# Patient Record
Sex: FEMALE | Race: AMERICAN INDIAN OR ALASKA NATIVE
[De-identification: names, ages, dates, MRNs, and addresses within clinical notes are randomized per-mention and may not be internally consistent; named-entity substitution may affect disease eponyms.]

---

## 2019-11-18 ENCOUNTER — HOSPITAL ENCOUNTER (EMERGENCY)
Dept: HOSPITAL 5 - ED | Age: 64
Discharge: HOME | End: 2019-11-18
Payer: SELF-PAY

## 2019-11-18 VITALS — SYSTOLIC BLOOD PRESSURE: 152 MMHG | DIASTOLIC BLOOD PRESSURE: 93 MMHG

## 2019-11-18 DIAGNOSIS — Z91.013: ICD-10-CM

## 2019-11-18 DIAGNOSIS — Z79.1: ICD-10-CM

## 2019-11-18 DIAGNOSIS — Z79.899: ICD-10-CM

## 2019-11-18 DIAGNOSIS — M17.12: Primary | ICD-10-CM

## 2019-11-18 PROCEDURE — 99283 EMERGENCY DEPT VISIT LOW MDM: CPT

## 2019-11-18 PROCEDURE — 73562 X-RAY EXAM OF KNEE 3: CPT

## 2019-11-18 NOTE — EMERGENCY DEPARTMENT REPORT
ED Extremity Problem HPI





- General


Chief complaint: Extremity Problem,Nontraumatic


Stated complaint: LT KNEE PAIN


Time Seen by Provider: 11/18/19 11:55


Source: patient


Mode of arrival: Ambulatory


Limitations: No Limitations





- History of Present Illness


Initial comments: 





This is a 64-year-old female who presents to ED complaining of knee pain for the

past week.  Patient states 3 days ago and got worse.  She denies any injury b or

trauma to the left knee.  Patient states any pain is throbbing and aching, 

intermittent.  It is 7 out of 10 intensity pain.  She denies having any Fall


MD Complaint: extremity pain (knee left)


Severity scale (0 -10): 5





- Related Data


                                  Previous Rx's











 Medication  Instructions  Recorded  Last Taken  Type


 


Cyclobenzaprine [Flexeril] 10 mg PO QHS PRN #20 tablet 11/18/19 Unknown Rx


 


Ibuprofen [Motrin 800 MG tab] 800 mg PO Q8HR PRN #30 tablet 11/18/19 Unknown Rx











                                    Allergies











Allergy/AdvReac Type Severity Reaction Status Date / Time


 


shellfish derived AdvReac  Vomiting Verified 11/18/19 10:57














ED Review of Systems


ROS: 


Stated complaint: LT KNEE PAIN


Other details as noted in HPI





Comment: All other systems reviewed and negative





ED Past Medical Hx





- Past Medical History


Previous Medical History?: No





- Surgical History


Past Surgical History?: No





- Social History


Smoking Status: Never Smoker


Substance Use Type: None





- Medications


Home Medications: 


                                Home Medications











 Medication  Instructions  Recorded  Confirmed  Last Taken  Type


 


Cyclobenzaprine [Flexeril] 10 mg PO QHS PRN #20 tablet 11/18/19  Unknown Rx


 


Ibuprofen [Motrin 800 MG tab] 800 mg PO Q8HR PRN #30 tablet 11/18/19  Unknown Rx














ED Physical Exam





- General


Limitations: No Limitations


General appearance: alert, in no apparent distress





- Head


Head exam: Present: atraumatic, normocephalic





- Eye


Eye exam: Present: normal appearance





- ENT


ENT exam: Present: mucous membranes moist





- Neck


Neck exam: Present: normal inspection





- Respiratory


Respiratory exam: Present: normal lung sounds bilaterally.  Absent: respiratory 

distress





- Cardiovascular


Cardiovascular Exam: Present: regular rate, normal rhythm.  Absent: systolic 

murmur, diastolic murmur, rubs, gallop





- GI/Abdominal


GI/Abdominal exam: Present: soft, normal bowel sounds





- Extremities Exam


Extremities exam: Present: normal inspection, full ROM, tenderness (mild 

tenderness to the anterior left knee, no swelling, no erythematous).  Absent: 

pedal edema, joint swelling, calf tenderness





- Back Exam


Back exam: Present: normal inspection





- Neurological Exam


Neurological exam: Present: alert, oriented X3





- Psychiatric


Psychiatric exam: Present: normal affect, normal mood





- Skin


Skin exam: Present: warm, dry, intact, normal color.  Absent: rash





ED Course


                                   Vital Signs











  11/18/19





  12:02


 


Temperature 97.6 F


 


Pulse Rate 80


 


Respiratory 20





Rate 


 


Blood Pressure 152/93


 


O2 Sat by Pulse 98





Oximetry 














ED Medical Decision Making





- Radiology Data


Radiology results: report reviewed, image reviewed


Fluoro Time In Minutes:





LEFT KNEE 3 VIEWS





INDICATION / CLINICAL INFORMATION:


Right knee pain and swelling since last Friday. No known injury.





COMPARISON:


None available.





FINDINGS:





BONES and JOINT(S): No acute fracture or subluxation. There is moderate 

tricompartmental


osteoarthritis, most significant along the patellofemoral compartment.


SOFT TISSUES: Expansion of the suprapatellar bursa likely represent synovial 

perforation and/or a


joint effusion. There is mild edema along the knee.





ADDITIONAL FINDINGS: None.





IMPRESSION:


Moderate osteoarthritis of the left knee.





Signer Name: Jeff Ley MD


Signed: 11/18/2019 12:24 PM


Workstation Name: [id number]








Transcribed By: MN


Dictated By: Jeff Ley MD


Electronically Authenticated By: Jeff Ley MD


Signed Date/Time: 11/18/19 1224











- Medical Decision Making





64-year-old female presents to ED with sterile arthritis of the left knee


ED course: Patient received prednisone, Tylenoland Flexeril in ED.


X-ray of the knee shows no acute findings.


Vital signs are normal patient is in no acute distress


Discussed with patient follow-up with primary care physician.  


Discussed the patient and take medications as prescribed. 


Patient has no neurological deficit.  Patient is alert and oriented 3  and 

understands all instructions given.


 Discussed  drowsiness effect of Flexeril makes her drowsy and not to operate 

machinery while taking flexeril


Critical care attestation.: 


If time is entered above; I have spent that time in minutes in the direct care 

of this critically ill patient, excluding procedure time.








ED Disposition


Clinical Impression: 


 Osteoarthritis of knee, unilateral





Disposition: DC-01 TO HOME OR SELFCARE


Is pt being admited?: No


Does the pt Need Aspirin: No


Condition: Stable


Instructions:  Osteoarthritis (ED), Knee Pain (ED)


Additional Instructions: 


Make sure to follow up with the primary care physician as discussed.


Take all your medications as you've been prescribed.


If you have any worsening symptoms or develop new symptoms please return to ED 

immediately.


Prescriptions: 


Cyclobenzaprine [Flexeril] 10 mg PO QHS PRN #20 tablet


 PRN Reason: Muscle Spasm


Ibuprofen [Motrin 800 MG tab] 800 mg PO Q8HR PRN #30 tablet


 PRN Reason: Pain


Referrals: 


JULIA ACEVEDO MD [Staff Physician] - 3-5 Days


[facility] [Provider Group] - 3-5 Days


Forms:  Accompanied Note, Work/School Release Form(ED)


Time of Disposition: 14:09

## 2019-11-18 NOTE — EVENT NOTE
ED Screening Note


Date of service: 11/18/19


Time: 11:55


ED Screening Note: 





This is a 64 y.o. F. that presents to the ER with left posterior knee pain and 

swelling x 1 week.





Patient reports worsening pain over the weekend.





Denies injury, numbness/tingling, weakness, bruising, or warmth.





PMH HTN and vertigo





This initial assessment/diagnostic orders/clinical plan/treatment(s) is/are 

subject to change based on patients health status, clinical progression and re-

assessment by fellow clinical providers in the ED. Further treatment and workup 

at subsequent clinical providers discretion. Patient/guardian urged not to elope

from the ED as their condition may be serious if not clinically assessed and 

managed. 





Initial orders include: 





XR of left knee

## 2019-11-18 NOTE — XRAY REPORT
LEFT KNEE 3 VIEWS



INDICATION / CLINICAL INFORMATION:

Right knee pain and swelling since last Friday. No known injury.



COMPARISON:

None available.

 

FINDINGS:



BONES and JOINT(S): No acute fracture or subluxation. There is moderate tricompartmental osteoarthrit
is, most significant along the patellofemoral compartment.

SOFT TISSUES: Expansion of the suprapatellar bursa likely represent synovial perforation and/or a azeb
nt effusion. There is mild edema along the knee.



ADDITIONAL FINDINGS: None.



IMPRESSION:

Moderate osteoarthritis of the left knee.



Signer Name: Jeff Ley MD 

Signed: 11/18/2019 12:24 PM

 Workstation Name: [id number]